# Patient Record
Sex: FEMALE | Race: WHITE | NOT HISPANIC OR LATINO | Employment: UNEMPLOYED | URBAN - NONMETROPOLITAN AREA
[De-identification: names, ages, dates, MRNs, and addresses within clinical notes are randomized per-mention and may not be internally consistent; named-entity substitution may affect disease eponyms.]

---

## 2017-06-09 ENCOUNTER — TRANSCRIBE ORDERS (OUTPATIENT)
Dept: ADMINISTRATIVE | Facility: HOSPITAL | Age: 47
End: 2017-06-09

## 2017-06-09 DIAGNOSIS — Z12.31 VISIT FOR SCREENING MAMMOGRAM: Primary | ICD-10-CM

## 2017-06-14 ENCOUNTER — TRANSCRIBE ORDERS (OUTPATIENT)
Dept: GENERAL RADIOLOGY | Facility: HOSPITAL | Age: 47
End: 2017-06-14

## 2017-06-14 ENCOUNTER — HOSPITAL ENCOUNTER (OUTPATIENT)
Dept: GENERAL RADIOLOGY | Facility: HOSPITAL | Age: 47
Discharge: HOME | End: 2017-06-14

## 2017-06-14 DIAGNOSIS — M54.5 CHRONIC LOW BACK PAIN, UNSPECIFIED BACK PAIN LATERALITY, WITH SCIATICA PRESENCE UNSPECIFIED: Primary | ICD-10-CM

## 2017-06-14 DIAGNOSIS — G89.29 CHRONIC LOW BACK PAIN, UNSPECIFIED BACK PAIN LATERALITY, WITH SCIATICA PRESENCE UNSPECIFIED: Primary | ICD-10-CM

## 2017-06-14 DIAGNOSIS — M25.552 LEFT HIP PAIN: ICD-10-CM

## 2017-06-14 DIAGNOSIS — G89.29 CHRONIC LOW BACK PAIN, UNSPECIFIED BACK PAIN LATERALITY, WITH SCIATICA PRESENCE UNSPECIFIED: ICD-10-CM

## 2017-06-14 DIAGNOSIS — M54.5 CHRONIC LOW BACK PAIN, UNSPECIFIED BACK PAIN LATERALITY, WITH SCIATICA PRESENCE UNSPECIFIED: ICD-10-CM

## 2017-06-15 ENCOUNTER — APPOINTMENT (OUTPATIENT)
Dept: GENERAL RADIOLOGY | Facility: HOSPITAL | Age: 47
End: 2017-06-15

## 2017-06-16 ENCOUNTER — HOSPITAL ENCOUNTER (OUTPATIENT)
Dept: GENERAL RADIOLOGY | Facility: HOSPITAL | Age: 47
Discharge: HOME | End: 2017-06-16

## 2017-06-16 ENCOUNTER — HOSPITAL ENCOUNTER (OUTPATIENT)
Dept: GENERAL RADIOLOGY | Facility: HOSPITAL | Age: 47
Discharge: HOME | End: 2017-06-16
Admitting: INTERNAL MEDICINE

## 2017-06-16 PROCEDURE — 73503 X-RAY EXAM HIP UNI 4/> VIEWS: CPT

## 2017-06-16 PROCEDURE — 72110 X-RAY EXAM L-2 SPINE 4/>VWS: CPT | Performed by: RADIOLOGY

## 2017-06-16 PROCEDURE — 72110 X-RAY EXAM L-2 SPINE 4/>VWS: CPT

## 2017-06-16 PROCEDURE — 73503 X-RAY EXAM HIP UNI 4/> VIEWS: CPT | Performed by: RADIOLOGY

## 2017-07-11 ENCOUNTER — HOSPITAL ENCOUNTER (OUTPATIENT)
Dept: MAMMOGRAPHY | Facility: HOSPITAL | Age: 47
Discharge: HOME | End: 2017-07-11
Admitting: INTERNAL MEDICINE

## 2017-07-11 DIAGNOSIS — Z12.31 VISIT FOR SCREENING MAMMOGRAM: ICD-10-CM

## 2017-07-11 PROCEDURE — G0202 SCR MAMMO BI INCL CAD: HCPCS

## 2017-07-11 PROCEDURE — 77063 BREAST TOMOSYNTHESIS BI: CPT | Performed by: RADIOLOGY

## 2017-07-11 PROCEDURE — 77063 BREAST TOMOSYNTHESIS BI: CPT

## 2017-07-11 PROCEDURE — 77067 SCR MAMMO BI INCL CAD: CPT | Performed by: RADIOLOGY

## 2017-08-07 ENCOUNTER — OFFICE VISIT (OUTPATIENT)
Dept: NEUROSURGERY | Facility: CLINIC | Age: 47
End: 2017-08-07

## 2017-08-07 VITALS
HEIGHT: 65 IN | BODY MASS INDEX: 31.49 KG/M2 | TEMPERATURE: 98.7 F | WEIGHT: 189 LBS | DIASTOLIC BLOOD PRESSURE: 94 MMHG | SYSTOLIC BLOOD PRESSURE: 146 MMHG

## 2017-08-07 DIAGNOSIS — M51.36 DEGENERATIVE DISC DISEASE, LUMBAR: Primary | ICD-10-CM

## 2017-08-07 PROBLEM — M51.369 DEGENERATIVE DISC DISEASE, LUMBAR: Status: ACTIVE | Noted: 2017-08-07

## 2017-08-07 PROCEDURE — 99203 OFFICE O/P NEW LOW 30 MIN: CPT | Performed by: NEUROLOGICAL SURGERY

## 2017-08-07 RX ORDER — GABAPENTIN 300 MG/1
300 CAPSULE ORAL 3 TIMES DAILY
COMMUNITY

## 2017-08-07 RX ORDER — METHOCARBAMOL 750 MG/1
750 TABLET, FILM COATED ORAL NIGHTLY
Qty: 30 TABLET | Refills: 0 | Status: SHIPPED | OUTPATIENT
Start: 2017-08-07 | End: 2017-09-24 | Stop reason: SDUPTHER

## 2017-08-07 RX ORDER — NABUMETONE 750 MG/1
750 TABLET, FILM COATED ORAL 2 TIMES DAILY
Qty: 60 TABLET | Refills: 0 | Status: SHIPPED | OUTPATIENT
Start: 2017-08-07 | End: 2017-09-24 | Stop reason: SDUPTHER

## 2017-08-07 RX ORDER — LORATADINE 10 MG/1
CAPSULE, LIQUID FILLED ORAL
COMMUNITY

## 2017-08-07 RX ORDER — HYDROCHLOROTHIAZIDE 12.5 MG/1
12.5 TABLET ORAL DAILY
COMMUNITY

## 2017-08-07 RX ORDER — DULOXETIN HYDROCHLORIDE 60 MG/1
60 CAPSULE, DELAYED RELEASE ORAL DAILY
COMMUNITY

## 2017-08-07 NOTE — PROGRESS NOTES
"Brandi Castorena  1970  [id number]      Chief Complaint   Patient presents with   • Back Pain   • Hip Pain   • Numbness       HISTORY OF PRESENT ILLNESS:  [ This is a 47-year-old female who has a rather protracted history of low back pain over the past 10 years which is increased.  It is worsened with sitting and laying flat and lifting.  The pain is primarily in the left hip which radiates of the left leg in a ill-defined, nondermatomal distribution.  She has not been to physical therapy.  She does have a family history of degenerative osteoarthritis and rheumatoid arthritis.    Her job duties at the present time include sitting.  Her employer has bought her a \"new chair\".  Whether this is a ergonomic or not remains to be determined, however.  He has a \"headset\" branching the telephone.  Her workstation otherwise has not been evaluated from ergonomic perspective.]    Past Medical History:   Diagnosis Date   • Migraines    • Rheumatoid arthritis        Past Surgical History:   Procedure Laterality Date   • TUBAL ABDOMINAL LIGATION      5/2001       Family History   Problem Relation Age of Onset   • Other Daughter      rhabdomyosarcoma at age 9   • Breast cancer Neg Hx        Social History     Social History   • Marital status:      Spouse name: N/A   • Number of children: N/A   • Years of education: N/A     Occupational History   • Not on file.     Social History Main Topics   • Smoking status: Former Smoker   • Smokeless tobacco: Not on file      Comment: smoked 1ppd for twenty years however quit one and haklf years ago   • Alcohol use Yes      Comment: occasional alcohol   • Drug use: No   • Sexual activity: Not on file     Other Topics Concern   • Not on file     Social History Narrative       Allergies   Allergen Reactions   • Tetracyclines & Related          Current Outpatient Prescriptions:   •  DULoxetine (CYMBALTA) 60 MG capsule, Take 60 mg by mouth Daily., Disp: , Rfl:   •  gabapentin (NEURONTIN) " 300 MG capsule, Take 300 mg by mouth 3 (Three) Times a Day., Disp: , Rfl:   •  hydrochlorothiazide (HYDRODIURIL) 12.5 MG tablet, Take 12.5 mg by mouth Daily., Disp: , Rfl:   •  IRON, FERROUS GLUCONATE, PO, Take 325 mg by mouth 3 (three) times a day., Disp: , Rfl:   •  Loratadine 10 MG capsule, Take  by mouth., Disp: , Rfl:     Review of Systems   Constitutional: Positive for fatigue and fever. Negative for activity change, appetite change, chills, diaphoresis and unexpected weight change.   HENT: Positive for sinus pressure. Negative for congestion, dental problem, drooling, ear discharge, ear pain, facial swelling, hearing loss, mouth sores, nosebleeds, postnasal drip, rhinorrhea, sneezing, sore throat, tinnitus, trouble swallowing and voice change.    Eyes: Positive for itching. Negative for photophobia, pain, discharge, redness and visual disturbance.   Respiratory: Negative for apnea, cough, choking, chest tightness, shortness of breath, wheezing and stridor.    Cardiovascular: Negative for chest pain, palpitations and leg swelling.   Gastrointestinal: Negative for abdominal distention, abdominal pain, anal bleeding, blood in stool, constipation, diarrhea, nausea, rectal pain and vomiting.   Endocrine: Positive for cold intolerance. Negative for heat intolerance, polydipsia, polyphagia and polyuria.   Genitourinary: Negative for decreased urine volume, difficulty urinating, dysuria, enuresis, flank pain, frequency, genital sores, hematuria and urgency.   Musculoskeletal: Positive for arthralgias, back pain, joint swelling and myalgias. Negative for gait problem, neck pain and neck stiffness.   Skin: Negative for color change, pallor, rash and wound.   Allergic/Immunologic: Negative for environmental allergies, food allergies and immunocompromised state.   Neurological: Negative for dizziness, tremors, seizures, syncope, facial asymmetry, speech difficulty, weakness, light-headedness, numbness and headaches.  "  Hematological: Negative for adenopathy. Does not bruise/bleed easily.   Psychiatric/Behavioral: Negative for agitation, behavioral problems, confusion, decreased concentration, dysphoric mood, hallucinations, self-injury, sleep disturbance and suicidal ideas. The patient is not nervous/anxious and is not hyperactive.        Vitals:    08/07/17 1132   BP: 146/94   BP Location: Right arm   Patient Position: Sitting   Temp: 98.7 °F (37.1 °C)   TempSrc: Temporal Artery    Weight: 189 lb (85.7 kg)   Height: 65\" (165.1 cm)       Neurological Examination:    Mental status/speech: The patient is alert and oriented.  Speech is clear without aphysia or dysarthria.  No overt cognitive deficits.    Cranial nerve examination:    Olfaction: Smell is intact.  Vision: Vision is intact without visual field abnormalities.  Funduscopic examination is normal.  No pupillary irregularity.  Ocular motor examination: The extraocular muscles are intact.  There is no diplopia.  The pupil is round and reactive to both light and accommodation.  There is no nystagmus.  Facial movement/sensation: There is no facial weakness.  Sensation is intact in the first, second, and third divisions of the trigeminal nerve.  The corneal reflex is intact.  Auditory: Hearing is intact to finger rub bilaterally.  Cranial nerves IX, X, XI, XII: Phonation is normal.  No dysphagia.  Tongue is protruded in the midline without atrophy.  The gag reflex is intact.  Shoulder shrug is normal.    Musculoligamentous ligamentous examination: Straight leg raising is negative.  Internal/external rotation and hip (Hunter's sign) is positive.  There is no weakness, sensory loss or reflex asymmetry.  Gait is normal.  No Babinski Kimberlee or clonus.    Medical Decision Making:     Diagnostic Data Set:  Lumbar MRI data set show multilevel degenerative disc disease which is most severe at L5-S1.  At L5 S1 she has Modic changes suggestive of segmental instability and disc space " narrowing.      Assessment:  Degenerative disc disease with segmental instability L5-S1          Recommendations:  This is undoubtedly genetically linked.  I do not believe, at the present time, surgery is indicated.  We should pursue a conservative treatment protocol.  I have sent her to physical therapy for a home exercise program education and the [facility] program as well as asked the therapist to evaluate the ergonomics of her workstation including her chair and make the appropriate recommendations and changes.  I have given her a prescription of Relafen 750 mg twice a day and Robaxin 750 mg at night.  If these changes and suggestions are not helpful I've asked her to call me once again.  At that time I would recommend facet block or epidural steroid injections.  As time metrics relates this may worsen.  If that be the case and the her pain worsened jail would be a surgical condition.        I greatly appreciate the opportunity to see and evaluate this individual.  If you have questions or concerns regarding issues that I may have overlooked please call me at any time: [phone].  Cheikh Means M.D.  [facility]  [address] [place] [address]    Scribed for Ajith Means MD by Filipe Pepper CMA. 8/7/2017  12:06 PM     I have read and concur with the information provided by the scribe.  Ajith Means MD

## 2017-08-07 NOTE — PATIENT INSTRUCTIONS
Call Dr. Means on a Monday or Tuesday.   Ask for Mariel,  and leave a message for  Dr. Means.  He will call you back at the end of the day as soon as he can.     [phone]

## 2017-09-24 DIAGNOSIS — M51.36 DEGENERATIVE DISC DISEASE, LUMBAR: ICD-10-CM

## 2017-09-25 ENCOUNTER — TELEPHONE (OUTPATIENT)
Dept: NEUROSURGERY | Facility: CLINIC | Age: 47
End: 2017-09-25

## 2017-09-25 DIAGNOSIS — M51.36 DEGENERATIVE DISC DISEASE, LUMBAR: ICD-10-CM

## 2017-09-25 RX ORDER — NABUMETONE 750 MG/1
TABLET, FILM COATED ORAL
Qty: 60 TABLET | Refills: 0 | Status: SHIPPED | OUTPATIENT
Start: 2017-09-25 | End: 2017-09-25 | Stop reason: SDUPTHER

## 2017-09-25 RX ORDER — NABUMETONE 750 MG/1
TABLET, FILM COATED ORAL
Qty: 60 TABLET | Refills: 1 | Status: SHIPPED | OUTPATIENT
Start: 2017-09-25

## 2017-09-25 RX ORDER — METHOCARBAMOL 750 MG/1
TABLET, FILM COATED ORAL
Qty: 30 TABLET | Refills: 0 | Status: SHIPPED | OUTPATIENT
Start: 2017-09-25 | End: 2017-09-25 | Stop reason: SDUPTHER

## 2017-09-25 RX ORDER — METHOCARBAMOL 750 MG/1
TABLET, FILM COATED ORAL
Qty: 30 TABLET | Refills: 2 | Status: SHIPPED | OUTPATIENT
Start: 2017-09-25

## 2017-09-25 NOTE — TELEPHONE ENCOUNTER
----- Message from Mariel Leal sent at 9/25/2017  1:50 PM EDT -----  Contact: [phone]  PATIENT CALLING TO REPORT ON HER CONDITION.  STATES SHE IS DOING BETTER.  STILL IN P.T.  STATES SHE NEEDS A REFILL OF ROBAXIN 750 ONE AT NIGHT.    PHARMACY:  [facility] - [phone].    PATIENT IS IN [facility].

## 2017-10-13 ENCOUNTER — LAB REQUISITION (OUTPATIENT)
Dept: LAB | Facility: HOSPITAL | Age: 47
End: 2017-10-13

## 2017-10-13 DIAGNOSIS — G56.01 CARPAL TUNNEL SYNDROME OF RIGHT WRIST: ICD-10-CM

## 2017-10-13 LAB — POTASSIUM BLDA-SCNC: 2.97 MMOL/L (ref 3.5–5.3)

## 2017-10-13 PROCEDURE — 84132 ASSAY OF SERUM POTASSIUM: CPT | Performed by: PLASTIC SURGERY

## 2017-10-24 ENCOUNTER — TRANSCRIBE ORDERS (OUTPATIENT)
Dept: PHYSICAL THERAPY | Facility: CLINIC | Age: 47
End: 2017-10-24

## 2017-10-24 ENCOUNTER — TREATMENT (OUTPATIENT)
Dept: PHYSICAL THERAPY | Facility: CLINIC | Age: 47
End: 2017-10-24

## 2017-10-24 DIAGNOSIS — Z98.890 STATUS POST CARPAL TUNNEL RELEASE: Primary | ICD-10-CM

## 2017-10-24 DIAGNOSIS — Z47.89 ORTHOPEDIC AFTERCARE: ICD-10-CM

## 2017-10-24 DIAGNOSIS — G56.01 CARPAL TUNNEL SYNDROME OF RIGHT WRIST: Primary | ICD-10-CM

## 2017-10-24 PROCEDURE — 97110 THERAPEUTIC EXERCISES: CPT | Performed by: PHYSICAL THERAPIST

## 2017-10-24 PROCEDURE — 97161 PT EVAL LOW COMPLEX 20 MIN: CPT | Performed by: PHYSICAL THERAPIST

## 2017-10-24 PROCEDURE — A9999 DME SUPPLY OR ACCESSORY, NOS: HCPCS | Performed by: PHYSICAL THERAPIST

## 2017-10-24 NOTE — PROGRESS NOTES
Physical Therapy Initial Evaluation and Plan of Care        Subjective Evaluation    History of Present Illness  Onset date: Insidious onset 3-4 years.  Date of surgery: 10/13/2017  Mechanism of injury: Insidious onset of right wrist pain and numbness with hand weakness. She has a history of RA.  S/P right CTR, she feels the surgery has helped a lot with pain and tightness.    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 Quality of life: good    Pain  Current pain ratin  At best pain ratin  At worst pain ratin  Location: Right dorsal wrist is sore.  Quality: dull ache and tight  Relieving factors: rest and support (Surgery)  Exacerbated by: Unsure since surgery.    Hand dominance: left    Diagnostic Tests  EMG: abnormal    Treatments  No previous or current treatments           Objective       Observations     Right Wrist/Hand   Positive for edema and incision.     Additional Observation Details  Surgery site clean, some eschar.    Tenderness     Additional Tenderness Details  Mild tenderness around surgery site    Neurological Testing   Sensation     Wrist/Hand     Right   Intact: light touch, pin prick and sharp/dull discrimination    Active Range of Motion     Right Wrist   Wrist flexion: 68 degrees   Wrist extension: 70 degrees   Radial deviation: 20 degrees   Ulnar deviation: 30 degrees     Strength/Myotome Testing     Left Wrist/Hand      (2nd hand position)     Trial 1: 65 lbs    Thumb Strength  Key/Lateral Pinch     Trial 1: 14 lbs  Palmar/Three-Point Pinch     Trial 1: 12 lbs    Right Wrist/Hand   Normal wrist strength     (2nd hand position)     Trial 1: 34 lbs    Thumb Strength   Key/Lateral Pinch     Trial 1: 13 lbs  Palmar/Three-Point Pinch     Trial 1: 8 lbs    Tests     Right Shoulder   Positive ULTT2.     Right Wrist/Hand   Positive Tinel's sign (medial nerve).     Additional Tests Details  Right median nerve compression test +         Assessment &  Plan     Assessment  Impairments: abnormal or restricted ROM, activity intolerance, impaired physical strength, lacks appropriate home exercise program and pain with function  Assessment details: Pt. is a 47 year old female with a history of CTS symptoms on the right. She is S/P  carpal tunnel release surgery on 10/13/2017.  The patient is doing well post surgically.  Neurological exam is normal today.  Problems:  discomfort, decreased strength, tender incision, positive neural tension.  The order was for instruction in home program.  Prognosis: good  Prognosis details:   Goals to be met today.  1.  The pt is independent with HEP.   Goal Met.  Functional Limitations: carrying objects, lifting, pushing, uncomfortable because of pain and reaching overhead  Plan  Therapy options: will be seen for skilled physical therapy services  Planned therapy interventions: home exercise program  Duration in visits: 1  Treatment plan discussed with: patient  Plan details: The patient was sent for evaluation and instruction in HEP following a CTR surgery.  The patient is independent with HEP and does not need further skilled rehab at this time.  The pt is considered discharged from our services at this time.        Manual Therapy:         mins  46307;  Therapeutic Exercise:    15     mins  46366;     Neuromuscular Turner:    =    mins  81133;    Therapeutic Activity:     ==     mins  77058;     Gait Training:           mins  94043;     Ultrasound:         mins  18481;    Electrical Stimulation:         mins  48441 ( );  Dry Needling          mins self-pay    Timed Treatment:   15   mins   Total Treatment:     30   mins    PT SIGNATURE: Gary Sena, PT   DATE TREATMENT INITIATED: 10/24/2017    Initial Certification  Certification Period: 1/22/2018  I certify that the therapy services are furnished while this patient is under my care.  The services outlined above are required by this patient, and will be reviewed every 90  days.     PHYSICIAN: Marialuisa Gregorio MD      DATE:     Please sign and return via fax to  .. Thank you, [facility].

## 2018-07-12 ENCOUNTER — LAB REQUISITION (OUTPATIENT)
Dept: LAB | Facility: HOSPITAL | Age: 48
End: 2018-07-12

## 2018-07-12 DIAGNOSIS — M25.531 PAIN IN RIGHT WRIST: ICD-10-CM

## 2018-07-12 LAB — POTASSIUM BLDA-SCNC: 3.91 MMOL/L (ref 3.5–5.3)

## 2018-07-12 PROCEDURE — 84132 ASSAY OF SERUM POTASSIUM: CPT | Performed by: PLASTIC SURGERY

## 2018-07-25 ENCOUNTER — OFFICE VISIT (OUTPATIENT)
Dept: PHYSICAL THERAPY | Facility: CLINIC | Age: 48
End: 2018-07-25

## 2018-07-25 DIAGNOSIS — Z47.89 ORTHOPEDIC AFTERCARE: ICD-10-CM

## 2018-07-25 DIAGNOSIS — M25.831 ULNAR ABUTMENT SYNDROME OF RIGHT WRIST: Primary | ICD-10-CM

## 2018-07-25 DIAGNOSIS — M25.531 RIGHT WRIST PAIN: ICD-10-CM

## 2018-07-25 DIAGNOSIS — S69.81XA INJURY OF TRIANGULAR FIBROCARTILAGE COMPLEX (TFCC) OF RIGHT WRIST, INITIAL ENCOUNTER: ICD-10-CM

## 2018-07-25 PROCEDURE — L3808 WHFO, RIGID W/O JOINTS: HCPCS | Performed by: PHYSICAL THERAPIST

## 2018-07-26 ENCOUNTER — TRANSCRIBE ORDERS (OUTPATIENT)
Dept: PHYSICAL THERAPY | Facility: CLINIC | Age: 48
End: 2018-07-26

## 2018-07-26 DIAGNOSIS — Z98.890 STATUS POST OSTEOTOMY: Primary | ICD-10-CM

## 2018-07-26 DIAGNOSIS — Z47.89 ORTHOPEDIC AFTERCARE: ICD-10-CM

## 2018-07-26 NOTE — PROGRESS NOTES
Brandi Castorena 1970   Diagnosis/ Surgery: Right ulnar impaction, TFCC Injury, S/P Ulna shortening              Date Of Injury: Insidious onset    Date Of Surgery:7/12/2018    Hand Dominance: Left  History of Present Condition: 5+ years of right wrist pain and swelling, worsening with time. Diagnosis of ulnar impaction with TFCC injury.  S/P ulnar shortening. Pt feel surgery was successful.  Medical/Vocational History/ Medications: See MD note in Media    Pain: Pain with movement of the wrist, at the wrist.    Edema: Moderated plus right hand/wrist/forearm  Sensibility: WNL   Wound Status:Surgical wound ulna side of forearm, healing well.  ROM/ Strength/Test: Not assessed due to surgical precautions    Splinting:  · Patient was measure and fit with a custom fabricated volar forearm based wrist/hand immobilization splint.   · Patient was instructed in wearing schedule, precautions and care of the splint during this visit.   · Patient was instructed in proper donning/doffing of splint.   Assessment:  · Patient was fitted and appropriate splint was fabricated this date.  · Patient reported that splint was comfortable and had no complications with the fit of the splint.  · Patient was instructed and patient verbalized understanding of precautions, wear and care of the splint.   · Patient demonstrated independent donning/doffing of splint during treatment today.  Goals:  · Patient was fitted properly with appropriate splint for diagnosis  · Patient was educated on precautions, wear schedule and care of splint  · Patient demonstrated independence with donning/doffing of the splint.  · Splint was provided to Protect Healing Structures, Restrict Mobility, Improve joint alignment.  Plan:  · No additional treatment is required for this patient at this time. The patient is therefore discharged from therapy.  · Patient advised to contact therapist with any additional questions or concerns regarding the fit and function of the  splint.  · Patient will be seen for splint issues as needed   · Wear Instructions: Off for hygiene       PT SIGNATURE: Gary Sena PT, CHT   DATE TREATMENT INITIATED: 7/25/2018    Physician Signature____________________________________ Date____________

## 2018-10-10 ENCOUNTER — HOSPITAL ENCOUNTER (OUTPATIENT)
Dept: MAMMOGRAPHY | Facility: HOSPITAL | Age: 48
Discharge: HOME | End: 2018-10-10
Admitting: INTERNAL MEDICINE

## 2018-10-10 DIAGNOSIS — N64.4 BREAST PAIN: ICD-10-CM

## 2018-10-10 PROCEDURE — 77066 DX MAMMO INCL CAD BI: CPT | Performed by: RADIOLOGY

## 2018-10-10 PROCEDURE — 77066 DX MAMMO INCL CAD BI: CPT

## 2018-10-10 PROCEDURE — G0279 TOMOSYNTHESIS, MAMMO: HCPCS

## 2018-10-10 PROCEDURE — 77062 BREAST TOMOSYNTHESIS BI: CPT | Performed by: RADIOLOGY

## 2018-11-01 ENCOUNTER — APPOINTMENT (OUTPATIENT)
Dept: MAMMOGRAPHY | Facility: HOSPITAL | Age: 48
End: 2018-11-01

## 2019-06-19 ENCOUNTER — HOSPITAL ENCOUNTER (OUTPATIENT)
Dept: MAMMOGRAPHY | Facility: HOSPITAL | Age: 49
Discharge: HOME | End: 2019-06-19

## 2019-06-19 ENCOUNTER — HOSPITAL ENCOUNTER (OUTPATIENT)
Dept: ULTRASOUND IMAGING | Facility: HOSPITAL | Age: 49
Discharge: HOME | End: 2019-06-19
Admitting: INTERNAL MEDICINE

## 2019-06-19 DIAGNOSIS — N60.01 SOLITARY BENIGN CYST OF RIGHT BREAST: ICD-10-CM

## 2019-06-19 DIAGNOSIS — N63.0 LUMP OR MASS IN BREAST: ICD-10-CM

## 2019-06-19 DIAGNOSIS — R92.8 ABNORMAL MAMMOGRAM OF RIGHT BREAST: ICD-10-CM

## 2019-06-19 PROCEDURE — 76641 ULTRASOUND BREAST COMPLETE: CPT | Performed by: RADIOLOGY

## 2019-06-19 PROCEDURE — G0279 TOMOSYNTHESIS, MAMMO: HCPCS

## 2019-06-19 PROCEDURE — 76641 ULTRASOUND BREAST COMPLETE: CPT

## 2019-06-19 PROCEDURE — 77065 DX MAMMO INCL CAD UNI: CPT | Performed by: RADIOLOGY

## 2019-06-19 PROCEDURE — 77061 BREAST TOMOSYNTHESIS UNI: CPT | Performed by: RADIOLOGY

## 2019-06-19 PROCEDURE — 77065 DX MAMMO INCL CAD UNI: CPT

## 2019-06-26 ENCOUNTER — HOSPITAL ENCOUNTER (OUTPATIENT)
Dept: ULTRASOUND IMAGING | Facility: HOSPITAL | Age: 49
Discharge: HOME | End: 2019-06-26
Admitting: RADIOLOGY

## 2019-06-26 ENCOUNTER — HOSPITAL ENCOUNTER (OUTPATIENT)
Dept: MAMMOGRAPHY | Facility: HOSPITAL | Age: 49
Discharge: HOME | End: 2019-06-26

## 2019-06-26 DIAGNOSIS — R92.8 ABNORMAL MAMMOGRAM OF RIGHT BREAST: ICD-10-CM

## 2019-06-26 PROCEDURE — A4648 IMPLANTABLE TISSUE MARKER: HCPCS

## 2019-06-26 PROCEDURE — 77065 DX MAMMO INCL CAD UNI: CPT | Performed by: RADIOLOGY

## 2019-06-26 PROCEDURE — 19083 BX BREAST 1ST LESION US IMAG: CPT | Performed by: RADIOLOGY

## 2019-06-26 NOTE — PROGRESS NOTES
Patient denies breast pain or discomfort, tolerated US guided biopsy well. Dressing clean, dry and intact, ice pack applied. Patient verbalized understanding of discharge instructions, written copy given for home review. Patient denies further needs of assistance. Discharged home in NAD.

## 2019-07-01 ENCOUNTER — TELEPHONE (OUTPATIENT)
Dept: ONCOLOGY | Facility: CLINIC | Age: 49
End: 2019-07-01

## 2019-07-01 LAB
LAB AP CASE REPORT: NORMAL
PATH REPORT.FINAL DX SPEC: NORMAL

## 2019-07-01 NOTE — TELEPHONE ENCOUNTER
----- Message from Jaimie Monroy MD sent at 7/1/2019  1:58 PM EDT -----  PATHOLOGY:    Nonproliferative fibrocystic changes with papillary apocrine metaplasia. Negative for in situ and invasive malignancy.    The pathology result is concordant with the imaging findings. Recommend 6 month follow-up diagnostic right mammogram. The patient will be notified of the results and recommendations by our breast care nurse.

## 2019-11-20 ENCOUNTER — OFFICE VISIT (OUTPATIENT)
Dept: SURGERY | Facility: CLINIC | Age: 49
End: 2019-11-20

## 2019-11-20 VITALS
BODY MASS INDEX: 27.82 KG/M2 | HEIGHT: 65 IN | HEART RATE: 82 BPM | WEIGHT: 167 LBS | DIASTOLIC BLOOD PRESSURE: 91 MMHG | SYSTOLIC BLOOD PRESSURE: 131 MMHG

## 2019-11-20 DIAGNOSIS — M79.89 SOFT TISSUE MASS: Primary | ICD-10-CM

## 2019-11-20 PROCEDURE — 11403 EXC TR-EXT B9+MARG 2.1-3CM: CPT | Performed by: SURGERY

## 2019-11-20 PROCEDURE — 76881 US COMPL JOINT R-T W/IMG: CPT | Performed by: SURGERY

## 2019-11-20 PROCEDURE — 99202 OFFICE O/P NEW SF 15 MIN: CPT | Performed by: SURGERY

## 2019-11-20 RX ORDER — TOPIRAMATE 50 MG/1
50 TABLET, FILM COATED ORAL 2 TIMES DAILY
Refills: 1 | COMMUNITY
Start: 2019-10-25

## 2019-11-20 NOTE — PROGRESS NOTES
"Subjective   Brandi Castorena is a 49 y.o. female is being seen for consultation today at the request of Marielle Morin MD for lump on back.    History of Present Illness  Ms. Castorena was seen in the office today for evaluation of a soft tissue mass of the right back.  It has been present for 2 months.  It causes discomfort of the surrounding muscles.  Allergies   Allergen Reactions   • Tetracyclines & Related      Current Outpatient Medications   Medication Sig Dispense Refill   • DULoxetine (CYMBALTA) 60 MG capsule Take 60 mg by mouth Daily.     • hydrochlorothiazide (HYDRODIURIL) 12.5 MG tablet Take 12.5 mg by mouth Daily.     • topiramate (TOPAMAX) 50 MG tablet Take 50 mg by mouth 2 (Two) Times a Day.  1   • gabapentin (NEURONTIN) 300 MG capsule Take 300 mg by mouth 3 (Three) Times a Day.     • IRON, FERROUS GLUCONATE, PO Take 325 mg by mouth 3 (three) times a day.     • Loratadine 10 MG capsule Take  by mouth.     • methocarbamol (ROBAXIN) 750 MG tablet Take 1 tablet by mouth every night 30 tablet 2   • nabumetone (RELAFEN) 750 MG tablet Take 1 tablet by mouth 2 times a day 60 tablet 1     No current facility-administered medications for this visit.      Past Medical History:   Diagnosis Date   • Migraines    • Rheumatoid arthritis (CMS/HCC)      Past Surgical History:   Procedure Laterality Date   • TUBAL ABDOMINAL LIGATION      5/2001     Review of Systems  General: negative  Integumentary: lump  Eyes: negative  ENT: negative  Respiratory: negative  Gastrointestinal: negative  Cardiovascular: negative  Neurological: negative  Psychiatric: negative  Hematologic/Lymphatic: negative  Genitourinary: negative  Musculoskeletal: negative  Endocrine: negative  Breasts: breast lump        Objective   Ht 165.1 cm (65\")   Wt 75.8 kg (167 lb)   BMI 27.79 kg/m²   Physical Exam  On examination this is a well-developed well-nourished female in no acute distress  HEENT examination: Within normal limits.  Conjunctiva pink.  " Nose and ears appear normal.  Neck: Supple, full range of motion.  No JVD.  Musculoskeletal: Full range of motion all extremities without focal weakness. Normal gait. No digital cyanosis.  Psych: Patient is alert, oriented x3. Mood and affect are appropriate.  Skin: Right upper back there is a relatively firm 2.5 cm mass.  It feels firmer than one would typically expect of a normal lipoma and the margins are less distinct.  It is not clear if the mass is subcu or deep to the muscle.  Results/Data    Procedures   Ultrasound soft tissue back    Indication: Palpable mass right upper back  Description: With use of the 12.5 MHz transducer the area of clinical concern was scanned in multiple planes.  Findings: There is fullness in the subcu plane overlying the palpable mass with the suggestion of a possible isoechoic mass.  This is within the subcu plane.  Impression: Probable lipoma corresponding to palpable mass  Plan: Follow-up as clinically indicated    Procedure Note    Procedure: Excision lipoma    Location: Upper right back    Anesthesia: 1% lidocaine with epinephrine    Description:  The risks and benefits of the procedure were discussed.  After prep with Betadine and infiltration with lidocaine and elliptical incision was made over the mass and carried down into the subcu tissue.  An inflamed lipoma was excised.  After obtaining hemostasis the incision was closed in a 2 layer closure of interrupted 3-0 Vicryl subdermal sutures, followed by 4-0 Vicryl subcuticular stitch.  Dressing was placed.  Incision length was 2.2 cm    Complications:  none    Follow-up: As needed    Assessment/Plan   Status post excision of symptomatic lipoma    Follow-up as needed  Of activity discussed         Discussion/Summary    Patient's Body mass index is 27.79 kg/m². BMI is above normal parameters. Recommendations include: educational material.       No future appointments.      Please note that portions of this note were completed  with a voice recognition program.

## 2021-03-18 ENCOUNTER — BULK ORDERING (OUTPATIENT)
Dept: CASE MANAGEMENT | Facility: OTHER | Age: 51
End: 2021-03-18

## 2021-03-18 DIAGNOSIS — Z23 IMMUNIZATION DUE: ICD-10-CM

## 2022-05-31 ENCOUNTER — HOSPITAL ENCOUNTER (OUTPATIENT)
Dept: HOSPITAL 79 - EXRD | Age: 52
End: 2022-05-31
Attending: PHYSICIAN ASSISTANT
Payer: COMMERCIAL

## 2022-05-31 DIAGNOSIS — M79.672: Primary | ICD-10-CM

## 2024-10-15 ENCOUNTER — OFFICE VISIT (OUTPATIENT)
Dept: PSYCHIATRY | Facility: CLINIC | Age: 54
End: 2024-10-15
Payer: COMMERCIAL

## 2024-10-15 VITALS
BODY MASS INDEX: 29.96 KG/M2 | HEART RATE: 81 BPM | HEIGHT: 65 IN | OXYGEN SATURATION: 96 % | DIASTOLIC BLOOD PRESSURE: 80 MMHG | WEIGHT: 179.8 LBS | SYSTOLIC BLOOD PRESSURE: 124 MMHG

## 2024-10-15 DIAGNOSIS — Z79.899 MEDICATION MANAGEMENT: ICD-10-CM

## 2024-10-15 DIAGNOSIS — F41.1 GAD (GENERALIZED ANXIETY DISORDER): Primary | ICD-10-CM

## 2024-10-15 DIAGNOSIS — Z13.39 ADHD (ATTENTION DEFICIT HYPERACTIVITY DISORDER) EVALUATION: ICD-10-CM

## 2024-10-15 LAB
AMPHET+METHAMPHET UR QL: NEGATIVE
AMPHETAMINES UR QL: NEGATIVE
BARBITURATES UR QL SCN: NEGATIVE
BENZODIAZ UR QL SCN: NEGATIVE
BUPRENORPHINE SERPL-MCNC: NEGATIVE NG/ML
CANNABINOIDS SERPL QL: POSITIVE
COCAINE UR QL: NEGATIVE
MDMA UR QL SCN: NEGATIVE
METHADONE UR QL SCN: NEGATIVE
MORPHINE/OPIATES SCREEN, URINE: NEGATIVE
OXYCODONE UR QL SCN: NEGATIVE
PCP UR QL SCN: NEGATIVE
PROPOXYPH UR QL SCN: NEGATIVE
TRICYCLICS UR QL SCN: NEGATIVE

## 2024-10-15 PROCEDURE — 80305 DRUG TEST PRSMV DIR OPT OBS: CPT

## 2024-10-15 PROCEDURE — 90792 PSYCH DIAG EVAL W/MED SRVCS: CPT

## 2024-10-15 RX ORDER — SUCRALFATE 1 G/1
1 TABLET ORAL 3 TIMES DAILY
COMMUNITY
Start: 2024-09-17

## 2024-10-15 RX ORDER — ROSUVASTATIN CALCIUM 5 MG/1
5 TABLET, COATED ORAL DAILY
COMMUNITY

## 2024-10-15 RX ORDER — PANTOPRAZOLE SODIUM 20 MG/1
20 TABLET, DELAYED RELEASE ORAL DAILY
COMMUNITY

## 2024-10-15 RX ORDER — AMITRIPTYLINE HYDROCHLORIDE 10 MG/1
10 TABLET ORAL DAILY
COMMUNITY
Start: 2024-10-03

## 2024-10-15 RX ORDER — BUSPIRONE HYDROCHLORIDE 10 MG/1
10 TABLET ORAL 2 TIMES DAILY
Qty: 60 TABLET | Refills: 0 | Status: SHIPPED | OUTPATIENT
Start: 2024-10-15 | End: 2024-11-14
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
## 2024-11-12 ENCOUNTER — OFFICE VISIT (OUTPATIENT)
Dept: PSYCHIATRY | Facility: CLINIC | Age: 54
End: 2024-11-12
Payer: COMMERCIAL

## 2024-11-12 VITALS
BODY MASS INDEX: 29.96 KG/M2 | HEART RATE: 78 BPM | HEIGHT: 65 IN | DIASTOLIC BLOOD PRESSURE: 82 MMHG | OXYGEN SATURATION: 96 % | SYSTOLIC BLOOD PRESSURE: 122 MMHG | WEIGHT: 179.8 LBS

## 2024-11-12 DIAGNOSIS — F41.1 GAD (GENERALIZED ANXIETY DISORDER): Primary | ICD-10-CM

## 2024-11-12 DIAGNOSIS — F90.0 ADHD (ATTENTION DEFICIT HYPERACTIVITY DISORDER), INATTENTIVE TYPE: ICD-10-CM

## 2024-11-12 PROCEDURE — 99214 OFFICE O/P EST MOD 30 MIN: CPT

## 2024-11-12 RX ORDER — DEXTROAMPHETAMINE SACCHARATE, AMPHETAMINE ASPARTATE, DEXTROAMPHETAMINE SULFATE AND AMPHETAMINE SULFATE 1.25; 1.25; 1.25; 1.25 MG/1; MG/1; MG/1; MG/1
5 TABLET ORAL 2 TIMES DAILY
Qty: 60 TABLET | Refills: 0 | Status: SHIPPED | OUTPATIENT
Start: 2024-11-12 | End: 2024-12-12

## 2024-11-12 RX ORDER — BUSPIRONE HYDROCHLORIDE 10 MG/1
10 TABLET ORAL 2 TIMES DAILY
Qty: 60 TABLET | Refills: 0 | Status: SHIPPED | OUTPATIENT
Start: 2024-11-12 | End: 2024-12-12
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
## 2024-12-02 DIAGNOSIS — F41.1 GAD (GENERALIZED ANXIETY DISORDER): ICD-10-CM

## 2024-12-10 ENCOUNTER — TELEMEDICINE (OUTPATIENT)
Dept: PSYCHIATRY | Facility: CLINIC | Age: 54
End: 2024-12-10
Payer: COMMERCIAL

## 2024-12-10 DIAGNOSIS — F90.0 ADHD (ATTENTION DEFICIT HYPERACTIVITY DISORDER), INATTENTIVE TYPE: Primary | ICD-10-CM

## 2024-12-10 DIAGNOSIS — F41.1 GAD (GENERALIZED ANXIETY DISORDER): ICD-10-CM

## 2024-12-10 RX ORDER — BUSPIRONE HYDROCHLORIDE 10 MG/1
10 TABLET ORAL 2 TIMES DAILY
Qty: 60 TABLET | Refills: 0 | OUTPATIENT
Start: 2024-12-10

## 2024-12-10 RX ORDER — BUSPIRONE HYDROCHLORIDE 10 MG/1
10 TABLET ORAL 2 TIMES DAILY
Qty: 60 TABLET | Refills: 0 | Status: SHIPPED | OUTPATIENT
Start: 2024-12-10 | End: 2025-01-09

## 2024-12-10 RX ORDER — DEXTROAMPHETAMINE SACCHARATE, AMPHETAMINE ASPARTATE, DEXTROAMPHETAMINE SULFATE AND AMPHETAMINE SULFATE 2.5; 2.5; 2.5; 2.5 MG/1; MG/1; MG/1; MG/1
10 TABLET ORAL DAILY
Qty: 30 TABLET | Refills: 0 | Status: SHIPPED | OUTPATIENT
Start: 2024-12-10 | End: 2025-01-09
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
## 2025-01-09 ENCOUNTER — TELEMEDICINE (OUTPATIENT)
Dept: PSYCHIATRY | Facility: CLINIC | Age: 55
End: 2025-01-09
Payer: COMMERCIAL

## 2025-01-09 DIAGNOSIS — F41.1 GAD (GENERALIZED ANXIETY DISORDER): ICD-10-CM

## 2025-01-09 DIAGNOSIS — F90.0 ADHD (ATTENTION DEFICIT HYPERACTIVITY DISORDER), INATTENTIVE TYPE: Primary | ICD-10-CM

## 2025-01-09 RX ORDER — DEXTROAMPHETAMINE SACCHARATE, AMPHETAMINE ASPARTATE, DEXTROAMPHETAMINE SULFATE AND AMPHETAMINE SULFATE 2.5; 2.5; 2.5; 2.5 MG/1; MG/1; MG/1; MG/1
10 TABLET ORAL 2 TIMES DAILY
Qty: 60 TABLET | Refills: 0 | Status: SHIPPED | OUTPATIENT
Start: 2025-01-09 | End: 2025-02-08

## 2025-01-09 RX ORDER — BUSPIRONE HYDROCHLORIDE 10 MG/1
10 TABLET ORAL 2 TIMES DAILY
Qty: 60 TABLET | Refills: 0 | Status: SHIPPED | OUTPATIENT
Start: 2025-01-09 | End: 2025-02-08
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
## 2025-02-06 ENCOUNTER — TELEMEDICINE (OUTPATIENT)
Dept: PSYCHIATRY | Facility: CLINIC | Age: 55
End: 2025-02-06
Payer: COMMERCIAL

## 2025-02-06 DIAGNOSIS — F41.1 GAD (GENERALIZED ANXIETY DISORDER): ICD-10-CM

## 2025-02-06 DIAGNOSIS — F90.0 ADHD (ATTENTION DEFICIT HYPERACTIVITY DISORDER), INATTENTIVE TYPE: Primary | ICD-10-CM

## 2025-02-06 RX ORDER — DEXTROAMPHETAMINE SACCHARATE, AMPHETAMINE ASPARTATE, DEXTROAMPHETAMINE SULFATE AND AMPHETAMINE SULFATE 2.5; 2.5; 2.5; 2.5 MG/1; MG/1; MG/1; MG/1
10 TABLET ORAL 2 TIMES DAILY
Qty: 60 TABLET | Refills: 0 | Status: SHIPPED | OUTPATIENT
Start: 2025-02-06 | End: 2025-03-08

## 2025-02-06 RX ORDER — BUSPIRONE HYDROCHLORIDE 10 MG/1
10 TABLET ORAL 2 TIMES DAILY
Qty: 60 TABLET | Refills: 1 | Status: SHIPPED | OUTPATIENT
Start: 2025-02-06 | End: 2025-04-07
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
## 2025-03-11 ENCOUNTER — TELEPHONE (OUTPATIENT)
Dept: PSYCHIATRY | Facility: CLINIC | Age: 55
End: 2025-03-11
Payer: COMMERCIAL

## 2025-03-11 DIAGNOSIS — F90.0 ADHD (ATTENTION DEFICIT HYPERACTIVITY DISORDER), INATTENTIVE TYPE: Primary | ICD-10-CM

## 2025-03-11 RX ORDER — DEXTROAMPHETAMINE SACCHARATE, AMPHETAMINE ASPARTATE, DEXTROAMPHETAMINE SULFATE AND AMPHETAMINE SULFATE 2.5; 2.5; 2.5; 2.5 MG/1; MG/1; MG/1; MG/1
10 TABLET ORAL 2 TIMES DAILY
Qty: 60 TABLET | Refills: 0 | Status: SHIPPED | OUTPATIENT
Start: 2025-03-11 | End: 2025-04-10

## 2025-03-11 NOTE — TELEPHONE ENCOUNTER
UNABLE TO SEND IN REQUEST DUE TO MEDICATION NOT BEING ON LIST TO SEND REFILL REQUEST. MARTIN SENT IN REFILL.

## 2025-04-03 ENCOUNTER — TELEMEDICINE (OUTPATIENT)
Dept: PSYCHIATRY | Facility: CLINIC | Age: 55
End: 2025-04-03
Payer: COMMERCIAL

## 2025-04-03 DIAGNOSIS — F41.1 GAD (GENERALIZED ANXIETY DISORDER): ICD-10-CM

## 2025-04-03 DIAGNOSIS — F90.0 ADHD (ATTENTION DEFICIT HYPERACTIVITY DISORDER), INATTENTIVE TYPE: Primary | ICD-10-CM

## 2025-04-03 DIAGNOSIS — F40.10 SOCIAL ANXIETY DISORDER: ICD-10-CM

## 2025-04-03 RX ORDER — PROPRANOLOL HYDROCHLORIDE 10 MG/1
10 TABLET ORAL 2 TIMES DAILY PRN
Qty: 60 TABLET | Refills: 1 | Status: SHIPPED | OUTPATIENT
Start: 2025-04-03 | End: 2025-06-02

## 2025-04-03 RX ORDER — DEXTROAMPHETAMINE SACCHARATE, AMPHETAMINE ASPARTATE, DEXTROAMPHETAMINE SULFATE AND AMPHETAMINE SULFATE 2.5; 2.5; 2.5; 2.5 MG/1; MG/1; MG/1; MG/1
10 TABLET ORAL 2 TIMES DAILY
Qty: 60 TABLET | Refills: 0 | Status: SHIPPED | OUTPATIENT
Start: 2025-04-03 | End: 2025-05-03

## 2025-04-03 RX ORDER — BUSPIRONE HYDROCHLORIDE 10 MG/1
20 TABLET ORAL 2 TIMES DAILY
Qty: 120 TABLET | Refills: 1 | Status: SHIPPED | OUTPATIENT
Start: 2025-04-03 | End: 2025-06-02
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
## 2025-04-28 DIAGNOSIS — F90.0 ADHD (ATTENTION DEFICIT HYPERACTIVITY DISORDER), INATTENTIVE TYPE: ICD-10-CM

## 2025-04-28 RX ORDER — DEXTROAMPHETAMINE SACCHARATE, AMPHETAMINE ASPARTATE, DEXTROAMPHETAMINE SULFATE AND AMPHETAMINE SULFATE 2.5; 2.5; 2.5; 2.5 MG/1; MG/1; MG/1; MG/1
10 TABLET ORAL 2 TIMES DAILY
Qty: 60 TABLET | Refills: 0 | Status: SHIPPED | OUTPATIENT
Start: 2025-04-28 | End: 2025-05-28

## 2025-05-29 ENCOUNTER — TELEMEDICINE (OUTPATIENT)
Dept: PSYCHIATRY | Facility: CLINIC | Age: 55
End: 2025-05-29
Payer: COMMERCIAL

## 2025-05-29 DIAGNOSIS — F90.0 ADHD (ATTENTION DEFICIT HYPERACTIVITY DISORDER), INATTENTIVE TYPE: Primary | ICD-10-CM

## 2025-05-29 DIAGNOSIS — F40.10 SOCIAL ANXIETY DISORDER: ICD-10-CM

## 2025-05-29 DIAGNOSIS — F41.1 GAD (GENERALIZED ANXIETY DISORDER): ICD-10-CM

## 2025-05-29 RX ORDER — DEXTROAMPHETAMINE SACCHARATE, AMPHETAMINE ASPARTATE, DEXTROAMPHETAMINE SULFATE AND AMPHETAMINE SULFATE 2.5; 2.5; 2.5; 2.5 MG/1; MG/1; MG/1; MG/1
10 TABLET ORAL 2 TIMES DAILY
Qty: 60 TABLET | Refills: 0 | Status: SHIPPED | OUTPATIENT
Start: 2025-05-29 | End: 2025-06-28

## 2025-05-29 RX ORDER — PROPRANOLOL HYDROCHLORIDE 10 MG/1
10 TABLET ORAL 2 TIMES DAILY PRN
Qty: 60 TABLET | Refills: 1 | Status: SHIPPED | OUTPATIENT
Start: 2025-05-29 | End: 2025-07-28

## 2025-05-29 RX ORDER — PROPRANOLOL HYDROCHLORIDE 10 MG/1
10 TABLET ORAL 2 TIMES DAILY PRN
Qty: 180 TABLET | OUTPATIENT
Start: 2025-05-29

## 2025-05-29 RX ORDER — BUSPIRONE HYDROCHLORIDE 10 MG/1
10 TABLET ORAL 2 TIMES DAILY
Qty: 60 TABLET | Refills: 2 | Status: SHIPPED | OUTPATIENT
Start: 2025-05-29 | End: 2025-08-27
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
## 2025-07-02 ENCOUNTER — TELEPHONE (OUTPATIENT)
Dept: PSYCHIATRY | Facility: CLINIC | Age: 55
End: 2025-07-02
Payer: COMMERCIAL

## 2025-07-03 DIAGNOSIS — F90.0 ADHD (ATTENTION DEFICIT HYPERACTIVITY DISORDER), INATTENTIVE TYPE: Primary | ICD-10-CM

## 2025-07-03 RX ORDER — DEXTROAMPHETAMINE SACCHARATE, AMPHETAMINE ASPARTATE, DEXTROAMPHETAMINE SULFATE AND AMPHETAMINE SULFATE 2.5; 2.5; 2.5; 2.5 MG/1; MG/1; MG/1; MG/1
10 TABLET ORAL 2 TIMES DAILY
Qty: 60 TABLET | Refills: 0 | Status: SHIPPED | OUTPATIENT
Start: 2025-07-03 | End: 2025-08-02

## 2025-07-25 DIAGNOSIS — F41.1 GAD (GENERALIZED ANXIETY DISORDER): ICD-10-CM

## 2025-07-25 DIAGNOSIS — F40.10 SOCIAL ANXIETY DISORDER: ICD-10-CM

## 2025-07-28 RX ORDER — PROPRANOLOL HYDROCHLORIDE 10 MG/1
10 TABLET ORAL 2 TIMES DAILY PRN
Qty: 60 TABLET | Refills: 1 | OUTPATIENT
Start: 2025-07-28

## 2025-07-28 RX ORDER — BUSPIRONE HYDROCHLORIDE 10 MG/1
10 TABLET ORAL 2 TIMES DAILY
Qty: 60 TABLET | Refills: 2 | OUTPATIENT
Start: 2025-07-28

## 2025-08-18 DIAGNOSIS — F41.1 GAD (GENERALIZED ANXIETY DISORDER): ICD-10-CM

## 2025-08-18 DIAGNOSIS — F90.0 ADHD (ATTENTION DEFICIT HYPERACTIVITY DISORDER), INATTENTIVE TYPE: Primary | ICD-10-CM

## 2025-08-18 RX ORDER — DEXTROAMPHETAMINE SACCHARATE, AMPHETAMINE ASPARTATE, DEXTROAMPHETAMINE SULFATE AND AMPHETAMINE SULFATE 2.5; 2.5; 2.5; 2.5 MG/1; MG/1; MG/1; MG/1
10 TABLET ORAL 2 TIMES DAILY
Qty: 60 TABLET | Refills: 0 | Status: SHIPPED | OUTPATIENT
Start: 2025-08-18 | End: 2025-09-17

## 2025-08-18 RX ORDER — BUSPIRONE HYDROCHLORIDE 10 MG/1
10 TABLET ORAL 2 TIMES DAILY
Qty: 60 TABLET | Refills: 0 | Status: SHIPPED | OUTPATIENT
Start: 2025-08-18 | End: 2025-08-25 | Stop reason: SDUPTHER

## 2025-08-18 RX ORDER — BUSPIRONE HYDROCHLORIDE 10 MG/1
10 TABLET ORAL 2 TIMES DAILY
Qty: 60 TABLET | Refills: 2 | Status: CANCELLED | OUTPATIENT
Start: 2025-08-18 | End: 2025-11-16

## 2025-08-19 ENCOUNTER — OFFICE VISIT (OUTPATIENT)
Dept: GASTROENTEROLOGY | Facility: CLINIC | Age: 55
End: 2025-08-19
Payer: COMMERCIAL

## 2025-08-19 VITALS
HEIGHT: 65 IN | RESPIRATION RATE: 18 BRPM | HEART RATE: 77 BPM | DIASTOLIC BLOOD PRESSURE: 82 MMHG | WEIGHT: 167.4 LBS | SYSTOLIC BLOOD PRESSURE: 118 MMHG | BODY MASS INDEX: 27.89 KG/M2 | OXYGEN SATURATION: 99 %

## 2025-08-19 DIAGNOSIS — R19.7 DIARRHEA, UNSPECIFIED TYPE: Chronic | ICD-10-CM

## 2025-08-19 DIAGNOSIS — Z12.11 ENCOUNTER FOR SCREENING FOR MALIGNANT NEOPLASM OF COLON: ICD-10-CM

## 2025-08-19 DIAGNOSIS — R10.84 GENERALIZED ABDOMINAL PAIN: Primary | Chronic | ICD-10-CM

## 2025-08-19 DIAGNOSIS — R11.0 NAUSEA: Chronic | ICD-10-CM

## 2025-08-19 DIAGNOSIS — E66.3 OVERWEIGHT WITH BODY MASS INDEX (BMI) OF 27 TO 27.9 IN ADULT: Chronic | ICD-10-CM

## 2025-08-19 DIAGNOSIS — K21.9 GASTROESOPHAGEAL REFLUX DISEASE, UNSPECIFIED WHETHER ESOPHAGITIS PRESENT: Chronic | ICD-10-CM

## 2025-08-19 DIAGNOSIS — K76.0 FATTY (CHANGE OF) LIVER, NOT ELSEWHERE CLASSIFIED: Chronic | ICD-10-CM

## 2025-08-19 RX ORDER — ONDANSETRON 8 MG/1
TABLET, ORALLY DISINTEGRATING ORAL
COMMUNITY
Start: 2025-07-02

## 2025-08-19 RX ORDER — ESTRADIOL AND NORETHINDRONE ACETATE 1; .5 MG/1; MG/1
1 TABLET ORAL DAILY
COMMUNITY
Start: 2025-07-28

## 2025-08-19 RX ORDER — ROSUVASTATIN CALCIUM 10 MG/1
1 TABLET, COATED ORAL DAILY
COMMUNITY
Start: 2025-07-27

## 2025-08-25 ENCOUNTER — TELEMEDICINE (OUTPATIENT)
Dept: PSYCHIATRY | Facility: CLINIC | Age: 55
End: 2025-08-25
Payer: COMMERCIAL

## 2025-08-25 DIAGNOSIS — F90.0 ADHD (ATTENTION DEFICIT HYPERACTIVITY DISORDER), INATTENTIVE TYPE: Primary | ICD-10-CM

## 2025-08-25 DIAGNOSIS — F41.1 GAD (GENERALIZED ANXIETY DISORDER): ICD-10-CM

## 2025-08-25 DIAGNOSIS — F40.10 SOCIAL ANXIETY DISORDER: ICD-10-CM

## 2025-08-25 PROCEDURE — 96127 BRIEF EMOTIONAL/BEHAV ASSMT: CPT

## 2025-08-25 PROCEDURE — 99214 OFFICE O/P EST MOD 30 MIN: CPT

## 2025-08-25 RX ORDER — BUSPIRONE HYDROCHLORIDE 10 MG/1
10 TABLET ORAL 2 TIMES DAILY
Qty: 60 TABLET | Refills: 1 | Status: SHIPPED | OUTPATIENT
Start: 2025-08-25 | End: 2025-10-24